# Patient Record
Sex: MALE
[De-identification: names, ages, dates, MRNs, and addresses within clinical notes are randomized per-mention and may not be internally consistent; named-entity substitution may affect disease eponyms.]

---

## 2019-07-24 ENCOUNTER — HOSPITAL ENCOUNTER (OUTPATIENT)
Dept: HOSPITAL 61 - PNCL | Age: 79
Discharge: HOME | End: 2019-07-24
Attending: ANESTHESIOLOGY
Payer: OTHER GOVERNMENT

## 2019-07-24 DIAGNOSIS — Z98.890: ICD-10-CM

## 2019-07-24 DIAGNOSIS — Z79.899: ICD-10-CM

## 2019-07-24 DIAGNOSIS — M19.90: ICD-10-CM

## 2019-07-24 DIAGNOSIS — Z99.81: ICD-10-CM

## 2019-07-24 DIAGNOSIS — I48.91: ICD-10-CM

## 2019-07-24 DIAGNOSIS — I10: ICD-10-CM

## 2019-07-24 DIAGNOSIS — J44.9: ICD-10-CM

## 2019-07-24 DIAGNOSIS — Z79.01: ICD-10-CM

## 2019-07-24 DIAGNOSIS — M79.604: ICD-10-CM

## 2019-07-24 DIAGNOSIS — M54.5: Primary | ICD-10-CM

## 2019-07-24 DIAGNOSIS — E11.9: ICD-10-CM

## 2019-07-24 DIAGNOSIS — Z79.84: ICD-10-CM

## 2019-07-24 PROCEDURE — G0463 HOSPITAL OUTPT CLINIC VISIT: HCPCS

## 2019-07-25 NOTE — PAIN
DATE OF SERVICE:  07/24/2019



INITIAL CONSULTATION FOR PAIN CLINIC



CHIEF COMPLAINT:  Low back and right lower extremity pain.



HISTORY OF PRESENT ILLNESS:  This is an 79-year-old male who presents with

history of pain in the low back for many years, worse over the past 2 years or

so.  The patient reports his pain has been getting worse with walking, standing,

changing positions, especially in the right side.  It is a very sharp, aching

pain that is dull, worse with standing and changing positions, better with

sitting or lying down, does not awaken him from sleep at night, better with

sleeping or lying down.  It does not affect his bowel or bladder control, but

does affect his ability to walk significantly.  He is using a cane in his right

hand to ambulate.  The patient has had physical therapy.  He has been doing some

stretching and strengthening exercises on his own.  The patient reports also

that he has had facet joint [facility], of

which neither one were helpful to decrease the pain on his right side.  The

patient reports he has had no other treatments at this time that he is aware of,

except some epidural injections in the past.  The patient is taking Tylenol up

to 3000 mg a day, which decreased the pain only by about 20%.  The patient

describes the pain as aching and dull, sometimes shooting and stabbing in the

right posterior hip, but generally not radiating to the leg further than the

posterior gluteus.  The patient rates his disability rate from 0-10, 10 being

the worst and 8 with family home responsibilities, social activities, self-care

and life support activities, and 1 with recreational activities.  The patient

did have MRI scan of the lumbar spine, demonstrating degenerative disk disease,

with space narrowing at L4-L5 and L5-S1 more significantly, no disk herniation,

with mild central canal stenosis at L2-L3 and moderate degenerative facet

disease at L3 through L5-S1.



PAST MEDICAL HISTORY:  Significant for diabetes type 2, COPD, on home oxygen,

hypertension, atrial fibrillation, and arthritis.



PAST SURGICAL HISTORY:  Previous lumbar surgery 15 years ago with left-sided

symptoms at that time and previous hernia repair.



CURRENT MEDICATIONS:  Include metformin, psyllium, vitamin B12, vitamin D3,

polyethylene glycol, alfuzosin, levothyroxine, fosinopril, finasteride,

albuterol, amiodarone, Eliquis, Symbicort and daily baby aspirin, also Tylenol.



FAMILY HISTORY:  Significant for heart disease.



SOCIAL HISTORY:  The patient does not drink alcohol, does not smoke, and does

not use any illegal, illicit or recreational drugs.  He is single, lives locally

and is currently retired.



REVIEW OF SYSTEMS:  The patient's review of systems is positive for those items

mentioned in history of present illness.  All systems reviewed and otherwise

negative.  It is complete, full and well documented on the patient's chart.



PHYSICAL EXAMINATION:

VITAL SIGNS:  The patient's blood pressure is 145/79, pulse 62, respirations 18,

temperature 97.8 degrees Fahrenheit.  Height is 6 feet 2 inches, weight is 256

pounds.

GENERAL:  The patient is awake, alert, oriented, appropriate, very pleasant

demeanor.

HEENT:  Head is normocephalic, atraumatic.  Extraocular movements are intact and

symmetrical.  Oral cavity:  Mucous membranes moist and pink.  Dentition is

intact.

NECK:  Shows anterior throat supple without palpable lymphadenopathy noted. 

Swallow reflex is symmetrical.

CHEST:  Shows normal with inspection.  Breath sounds are clear to auscultation

bilaterally.

HEART:  Shows S1, S2 clear.  No murmurs auscultated.

ABDOMEN:  Obese, soft, nontender, nondistended.  No palpable organomegaly is

noted.  No rebound or guarding demonstrated.

BACK:  Shows spine grossly in the midline, slight exaggeration of thoracic

kyphosis and _____ minor flattening of lumbar lordotic curvature.  Lumbar

paraspinous muscle shows symmetrical on inspection, with palpation shows some

moderate tenderness inferiorly, more on the right than the left.  No trigger

points, no radiation of pain, no tenderness over the spinous processes.  The

patient has good rotational motion both laterally greater than 10 degrees right

and left, as well as extension greater than 10 degrees, forward flexion 45

degrees without significant pain reported.  Posterior superior iliac spine is

severely tender on the right with palpation, but not the left.  Also, the

sacroiliac region on the right is very, very tender with palpation, but not on

the left side.

EXTREMITIES:  The patient's lower extremities show deep tendon reflexes at 1+ in

the patellar and tendo-calcaneus tendons.  Motor exam is strong with 5/5

dorsiflexion, extension, quadriceps and hamstring flexion and symmetrical. 

Peripheral pulses are 1+ posterior tibial.  No peripheral edema is noted.  Lower

extremities are warm and dry to touch, equal in color and appearance.  The

patient's Gaenslen's maneuver is positive on the right with posterior

displacement and external rotation of the right hip and low back with leg over

the bed, lower leg that is.  Left side is negative.  Liang's maneuvers are

grossly negative bilaterally.  The patient is able to stand, stand on his toes

without significant loss of balance.  He is using a cane in his right hand and

does walk with a significant limp favoring his right lower extremity.

SKIN:  Shows warm and dry, good turgor.  No edema.  No sores, rashes or

bruising.



IMPRESSION:

1.  This is an 79-year-old male with a long history of low back pain, status

post lumbar surgery several years ago for left-sided radiculopathy, now with

pain in the right sacroiliac joint consistent with sacroiliitis on the right

side.

2.  Arthritis.

3.  Atrial fibrillation.

4.  Hypertension.

5.  Type 2 diabetes.

6.  Anticoagulation therapy.



PLAN:  Options were discussed with the patient including conservative medical

management, physical therapy, and interventional technique.  He would like to

pursue interventional techniques.  We discussed a right-sided sacroiliac joint

injection.  We will wait for preauthorization from his cardiologist to hold his

Eliquis for 3 days prior to the injection and have him return to the clinic at

that time if deemed safe and appropriate to hold this, and we will plan on right

sacroiliac joint injection on return.

 



______________________________

BEAU MARSHALL MD



DR:  MELISSA/hernesto  JOB#:  [id number] / [id number]

DD:  07/24/2019 13:12  DT:  07/25/2019 00:51

## 2019-08-06 ENCOUNTER — HOSPITAL ENCOUNTER (OUTPATIENT)
Dept: HOSPITAL 61 - PNCL | Age: 79
End: 2019-08-06
Attending: ANESTHESIOLOGY
Payer: OTHER GOVERNMENT

## 2019-08-06 DIAGNOSIS — M51.36: ICD-10-CM

## 2019-08-06 DIAGNOSIS — M46.1: Primary | ICD-10-CM

## 2019-08-06 PROCEDURE — 27096 INJECT SACROILIAC JOINT: CPT

## 2019-08-07 NOTE — PAIN
DATE OF SERVICE:  08/06/2019



PROGRESS NOTE FOR PAIN CLINIC



DIAGNOSES:

1.  Bilateral sacroiliitis.

2.  Lumbar degenerative disk disease.



HISTORY OF PRESENT ILLNESS:  The patient is an 79-year-old male who returns for

followup status post initial evaluation and holding his Eliquis.  He has been

off of it for 3 days now and we were to proceed with bilateral sacroiliac joint

injections.  The patient reports his right side is much worse but his left side

has become almost as bad since we last talked to him, which was 07/24/2019.  The

patient reports no new motor or sensory deficits, no new bowel or bladder

incontinence or other complaints.  The patient's pain is an 8 on a scale of 10

at its worst over the past week, 7 on average, 4 at its least and is a 7 today. 

It is aching, sharp, dull, burning, becoming more constant, worse with

weightbearing, standing, walking or sitting for prolonged periods.  The patient

reports it has been awakening him from sleep and is a dull ache at night.



PHYSICAL EXAMINATION:

VITAL SIGNS:  The patient's blood pressure 138/74, pulse 58, respirations are

18, temperature 97.7 degrees Fahrenheit, height is 6 feet 2 inches, weight is

253 pounds.

GENERAL:  The patient is awake, alert, oriented, appropriate, very pleasant

demeanor.

HEENT:  Head is normocephalic, atraumatic.  Extraocular movements are intact and

symmetrical.  Oral cavity:  Mucous membranes moist and pink.  Dentition is

intact.

NECK:  Shows anterior throat supple.

CHEST:  Shows breath sounds clear to auscultation bilaterally.

HEART:  Shows S1, S2 clear.

ABDOMEN:  Soft, nontender, nondistended.

BACK:  Shows spine grossly in the midline, slight flattening of lumbar lordotic

curvature.  There is significant tenderness with palpation over the bilateral

posterior superior iliac spines, especially the right, but also in the left,

very severe pain over the superior aspect of the sacroiliac joint, but without

specific radiation.  The patient has good rotational motion of the lumbar spine,

both laterally as well as extension and flexion without significant increase in

pain.

EXTREMITIES:  Lower extremities show deep tendon reflexes at 1+ in the patellar

and tendo-calcaneus tendons.  Motor exam is strong with 5/5 dorsiflexion,

extension and equal bilaterally.  Peripheral pulses are 1+.  No peripheral edema

is noted bilaterally.



Options were discussed with the patient.  The patient's old chart was reviewed

as his current medication regimen updated.  Current review of systems updated

today as well.  We will proceed with bilateral sacroiliac joint injections using

C-arm fluoroscopic guidance.  Risks were again discussed including, but not

limited to bleeding, infection, possibility of intravascular injection sequelae,

spread of local anesthetic and numbness, side effects of steroid medication as

well as exposure to fluoroscopy and poor results regarding pain control.  The

patient understands and wished to proceed.  The patient will return to clinic in

approximately 2 weeks for followup.  She was counseled on return appointment,

activity level and side effects to be aware of.



DIAGNOSIS:  Bilateral sacroiliitis.



PROCEDURE:  Bilateral sacroiliac joint injection using C-arm fluoroscopic

guidance under sterile prep and drape using local anesthetic.



MEDICATIONS INJECTED:  A total of 120 mg of Depo-Medrol plus total of 6 mL of

0.25% bupivacaine and total of 3 mL of contrast.



CONDITION AT DISCHARGE:  Stable.  The patient tolerated the procedure well, had

no complications.

 



______________________________

BEAU MARSHALL MD



DR:  MELISSA/hernesto  JOB#:  [id number] / [id number]

DD:  08/06/2019 10:06  DT:  08/06/2019 20:32

## 2019-09-12 ENCOUNTER — HOSPITAL ENCOUNTER (OUTPATIENT)
Dept: HOSPITAL 61 - PNCL | Age: 79
End: 2019-09-12
Attending: ANESTHESIOLOGY
Payer: OTHER GOVERNMENT

## 2019-09-12 DIAGNOSIS — M46.1: ICD-10-CM

## 2019-09-12 DIAGNOSIS — M51.36: ICD-10-CM

## 2019-09-12 DIAGNOSIS — M79.18: Primary | ICD-10-CM

## 2019-09-12 PROCEDURE — 20553 NJX 1/MLT TRIGGER POINTS 3/>: CPT

## 2019-09-13 NOTE — PAIN
DATE OF SERVICE:  09/12/2019



PROGRESS NOTE FOR PAIN CLINIC



DIAGNOSES:

1.  Bilateral sacroiliitis.

2.  Lumbar degenerative disk disease.

3.  Myofascial pain.



HISTORY OF PRESENT ILLNESS:  The patient is an 79-year-old male who returns for

followup status post bilateral sacroiliac joint injections about 2 weeks ago. 

The patient reports still significant pain in the low back, but only on the

right side at this time.  The patient reports it is much more noticeable with

walking, standing, changing positions, and sitting for more than 15-20 minutes. 

The patient reports it is worse with first thing in the morning, getting up out

of bed, also with walking and changing positions or bending.  The patient

reports he has a burning pain, sharp and aching in the low back and the right

side mostly.  The patient reports his pain over the last week has been 7 on a

scale of 10 at its worst, 4 on an average, 2 at its least, and is a 2 today. 

The patient reports it is better with sitting or lying down, does not awaken

from sleep at night, and does not cause any loss of bowel or bladder

incontinence.  The patient reports that the sacroiliac joint injections were

only very minimally improving the pain, but only for a few days after the

injections.



PHYSICAL EXAMINATION:

VITAL SIGNS:  Today, the patient's blood pressure is 141/70, pulse 78,

respirations are 18, temperature 97.8 degrees Fahrenheit, height is 6 feet 2

inches, and weight is 234 pounds.

GENERAL:  The patient is awake, alert, oriented, appropriate, and very pleasant

demeanor.

HEENT:  Shows normocephalic and atraumatic.  Extraocular movements are intact

and symmetrical.  Oral cavity:  Mucous membranes moist and pink.  Dentition is

intact.

NECK:  Shows anterior throat is supple without palpable lymphadenopathy noted. 

Swallow reflex symmetrical.

CHEST:  Shows normal on inspection.  Breath sounds are clear to auscultation

bilaterally.

HEART:  Shows S1, S2 clear.  No murmurs auscultated.

ABDOMEN:  Soft, nontender, and nondistended.  No palpable organomegaly is noted.

 No rebound or guarding demonstrated.

BACK:  Shows spine grossly in the midline.  Normal appearing thoracic kyphosis

and lumbar lordotic curvature.  Lumbar paraspinous muscle shows symmetrical on

inspection, on palpation shows some moderate tenderness diffusely in the right

lumbar paraspinous musculature, is very firm rope-like musculature on the right

side only compared to the left without specific radiation, but with significant

pain with palpation.  The patient shows good rotational motion; however, the

lumbar spine both laterally as well as extension and flexion without significant

increase in pain.

EXTREMITIES:  The patient's lower extremities show deep tendon reflexes 1+ in

the patellar and tendo calcaneus tendons.  Motor exam is strong with 5/5

dorsiflexion and extension, quadriceps, and hamstring flexion is symmetrical. 

Peripheral pulses are 1+ posterior tibia.  No peripheral edema is noted.



Options were discussed with the patient.  The patient's old chart was reviewed

as his current medication regimen updated and current review of systems updated

today as well, and we will proceed with trigger point injections of the right

paraspinous musculature and right gluteus musculature as identified.  Risks were

discussed including, but not limited to bleeding, infection, possibility of

intravascular injection sequelae, spread of local anesthetic and numbness, side

effects of steroid medication and poor results regarding pain control.  The

patient understands and wishes to proceed.  The patient will return to clinic in

approximately 2 weeks for followup.  He was counseled on return appointment,

activity level, and side effects to be aware of.



DIAGNOSIS:  Myofascial pain.



PROCEDURE:  Trigger point injections, right-sided lumbar paraspinous musculature

and right-sided gluteus under sterile prep and drape using local anesthetic.



MEDICATION INJECTED:  A total of 6 mL of 0.25% bupivacaine and 40 mg total of

Depo-Medrol after negative aspiration at each injection.



CONDITION AT DISCHARGE:  Stable.  The patient tolerated the procedure well, had

no complications.

 



______________________________

BEAU MARSHALL MD



DR:  MELISSA/hernesto  JOB#:  [id number] / [id number]

DD:  09/12/2019 12:22  DT:  09/12/2019 22:52

## 2019-10-01 ENCOUNTER — HOSPITAL ENCOUNTER (OUTPATIENT)
Dept: HOSPITAL 61 - PNCL | Age: 79
End: 2019-10-01
Attending: ANESTHESIOLOGY
Payer: OTHER GOVERNMENT

## 2019-10-01 DIAGNOSIS — M79.18: ICD-10-CM

## 2019-10-01 DIAGNOSIS — M51.36: ICD-10-CM

## 2019-10-01 DIAGNOSIS — M17.11: Primary | ICD-10-CM

## 2019-10-01 DIAGNOSIS — M46.1: ICD-10-CM

## 2019-10-01 PROCEDURE — 20610 DRAIN/INJ JOINT/BURSA W/O US: CPT

## 2019-10-01 PROCEDURE — 77002 NEEDLE LOCALIZATION BY XRAY: CPT

## 2019-10-01 NOTE — PAIN
DATE OF SERVICE:  10/01/2019



PROGRESS NOTE FOR PAIN CLINIC



DIAGNOSES:

1.  Bilateral sacroiliitis.

2.  Lumbar degenerative disk disease.

3.  Myofascial pain.

4.  Right knee joint pain with osteoarthritis.



HISTORY OF PRESENT ILLNESS:  The patient is an 79-year-old male who returns for

followup status post trigger point injections with about a 75% improvement.  The

patient reports some pain in the right posterior hip, but only minimal.  The

patient reports his chief complaint today is his right knee, which we discussed

with him on his last visit.  Significant pain with walking, standing, changing

positions, especially putting all his weight on his right leg such as in case

stepping on the stair or curb and significant pain with ambulation.  The patient

reports it is better with lying down, sitting, even with getting the weight off

of his knee, the pain is almost gone.  The patient reports the pain is worse

with 4 on a scale of 10, over the past week average 4 and a 3 at its least and

is a 4 today.  The patient reports it is sharp, aching, dull and burning at

times, again with weightbearing much worse.  No new motor or sensory deficits;

however, no new bowel or bladder incontinence or other complaints.



PHYSICAL EXAMINATION:

VITAL SIGNS:  The patient's blood pressure 122/68, pulse 62, respirations 18,

temperature 97.8 degrees Fahrenheit, height is 6 feet 2 inches, weight is 248

pounds.

GENERAL:  The patient is awake, alert, oriented, appropriate, very pleasant

demeanor.

HEENT:  Shows normocephalic, atraumatic.  Extraocular movements are intact and

symmetrical.  Oral cavity:  Mucous membranes moist and pink.  Dentition is

intact.

NECK:  Shows anterior throat supple without palpable lymphadenopathy noted. 

Swallow reflex symmetrical.

CHEST:  Shows normal on inspection.  Breath sounds clear to auscultation

bilaterally.

HEART:  Shows S1, S2 clear.  No murmurs auscultated.

ABDOMEN:  Soft, nontender, nondistended.  No palpable organomegaly is noted.  No

rebound or guarding demonstrated.

BACK:  Shows spine grossly in the midline.  Lumbar paraspinous muscle shows

symmetrical on inspection, with palpation shows some moderate tenderness

diffusely bilaterally, but only diffusely.  No specific trigger point regions

are identified, but some mild tenderness over the posterior superior iliac spine

on the right and left, but only mildly with deep palpation.

EXTREMITIES:  The patient's lower extremities show deep tendon reflexes 1+ in

the patellar and tendo calcaneus tendons.  Motor exam is strong with 5/5

dorsiflexion, extension, quadriceps and hamstring flexion and equal.  The

patient's right knee shows some moderate tenderness with palpation over the

medial collateral ligament with deep palpation, but no radiation.  Left side is

nontender.  The patient has good range of motion, both actively and passively in

the right knee without ratcheting or crepitus.  Peripheral pulses are 1+

posterior tibia.  No peripheral edema is noted bilaterally.



Options were discussed with the patient.  The patient's old chart was reviewed

as his current medication regimen updated.  Current review of systems updated

today as well.  We will proceed with a right intra-articular knee joint

injection today with fluoroscopic guidance.  Risks were again discussed

including, but not limited to bleeding, infection, possibility of intravascular

injection sequelae, spread of local anesthetic and numbness, side effects of

steroid medication, exposure to fluoroscopy and poor results regarding pain

control.  The patient understands and wished to proceed.  The patient will

return to clinic in approximately 2 weeks for followup.  She was counseled on

return appointment, activity level and side effects to be aware of.



DIAGNOSIS:  Right knee joint pain with primary osteoarthritis, right knee joint.



PROCEDURE:  Right intra-articular knee joint injection using C-arm fluoroscopic

guidance under sterile prep and drape using local anesthetic.



MEDICATION INJECTED:  A total of 3 mL of 0.25% bupivacaine, 80 mg Depo-Medrol

and 1.5 mL of contrast.



CONDITION AT DISCHARGE:  Stable.  The patient tolerated the procedure well, had

no complications.

 



______________________________

BEAU MARSHALL MD



DR:  MELISSA/hernesto  JOB#:  [id number] / [id number]

DD:  10/01/2019 10:44  DT:  10/01/2019 11:22

## 2019-11-21 ENCOUNTER — HOSPITAL ENCOUNTER (OUTPATIENT)
Dept: HOSPITAL 61 - PNCL | Age: 79
Discharge: HOME | End: 2019-11-21
Attending: ANESTHESIOLOGY
Payer: OTHER GOVERNMENT

## 2019-11-21 DIAGNOSIS — M51.16: Primary | ICD-10-CM

## 2019-11-21 DIAGNOSIS — M46.1: ICD-10-CM

## 2019-11-21 DIAGNOSIS — M17.11: ICD-10-CM

## 2019-11-21 DIAGNOSIS — M79.18: ICD-10-CM

## 2019-11-21 PROCEDURE — G0463 HOSPITAL OUTPT CLINIC VISIT: HCPCS

## 2019-11-22 NOTE — PAIN
DATE OF SERVICE:  11/21/2019



PROGRESS NOTE FOR PAIN CLINIC



DIAGNOSES:

1.  Bilateral sacroiliitis.

2.  Lumbar radiculopathy with lumbar degenerative disk disease.

3.  Myofascial pain.

4.  Right knee joint pain with osteoarthritis.



HISTORY OF PRESENT ILLNESS:  The patient is an 79-year-old male who returns for

followup status post both sacroiliac joint injections, trigger point injections

and right knee injection.  The patient reports his right knee is doing very well

with near 100% improved, still some pain in the low back radiating down into the

right lower extremity, posterior gluteus, posterior thighs, posterior calf,

which is becoming more noticeable when he is walking only.  The patient reports

it is only on the right side, better with sitting or lying down, does not awaken

her from sleep at night, but much worse with walking, standing, radiating ____

he can walk to stand for about 10-15 minutes.  The patient reports it is aching

pain that is shooting and sharp and dull in the back, radiating, becoming more

constant in the right lower extremity.  The patient reports it is 7 on a scale

of 10 at its worst over the past week, 6 on average, 6 at its least and is 6

today.



PHYSICAL EXAMINATION:

VITAL SIGNS:  The patient's blood pressure is 136/65, pulse 56, respirations 18,

temperature 98.2 degrees Fahrenheit, height 6 feet 2 inches, weight is 248

pounds.

GENERAL:  The patient is awake, alert, oriented, appropriate, very pleasant

demeanor.

HEENT:  Head shows normocephalic, atraumatic.  Extraocular movements are intact

and symmetrical.  Oral cavity:  Mucous membranes moist and pink.  Dentition is

intact.

NECK:  Shows anterior throat supple without palpable lymphadenopathy noted. 

Swallow reflex symmetrical.

CHEST:  Shows normal on inspection.  Breath sounds are clear bilaterally.

HEART:  Shows S1, S2 clear.  No murmurs auscultated.

ABDOMEN:  Soft, nontender, nondistended.  No palpable organomegaly is noted.  No

rebound or guarding demonstrated.

BACK:  Shows spine grossly in the midline.  Normal appearing thoracic kyphosis

and lumbar lordotic curvature.  The patient's lumbar paraspinous muscle shows

symmetrical on inspection, on palpation shows some moderate tenderness diffusely

bilaterally, more on the right than the left, but symmetrical with some mild

tenderness over the sacroiliac regions as well, but only mildly without

radiation.

EXTREMITIES:  Lower extremities show deep tendon reflexes 1+ in the patellar and

tendo-calcaneus tendons.  Motor exam is strong with 5/5 dorsiflexion, extension,

quadriceps and hamstring flexion.  Peripheral pulses are 1+ posterior tibia.  No

peripheral edema is noted.



Options were discussed with the patient.  The patient's old chart was reviewed

as his current medication regimen updated.  Current review of systems updated

today as well.  We will have the patient hold his Eliquis as cleared by his

cardiologist for 3 days prior to return for lumbar epidural steroid injection. 

The patient will continue with stretching and strengthening exercises on his own

and activity as tolerated.  We will have him return once Eliquis held for lumbar

epidural steroid injection at that time.

 



______________________________

BEAU MARSHALL MD



DR:  MELISSA/hernesto  JOB#:  [id number] / [id number]

DD:  11/21/2019 11:03  DT:  11/21/2019 21:09

## 2019-12-05 ENCOUNTER — HOSPITAL ENCOUNTER (OUTPATIENT)
Dept: HOSPITAL 61 - PNCL | Age: 79
End: 2019-12-05
Attending: ANESTHESIOLOGY
Payer: OTHER GOVERNMENT

## 2019-12-05 DIAGNOSIS — M96.1: ICD-10-CM

## 2019-12-05 DIAGNOSIS — M51.16: Primary | ICD-10-CM

## 2019-12-05 DIAGNOSIS — M47.816: ICD-10-CM

## 2019-12-05 PROCEDURE — 62323 NJX INTERLAMINAR LMBR/SAC: CPT

## 2019-12-05 NOTE — PAIN
DATE OF SERVICE:  12/05/2019



PROGRESS NOTE FOR PAIN CLINIC



DIAGNOSES:  Lumbar radiculopathy with lumbar degenerative disk disease and

lumbar spondylosis.



HISTORY OF PRESENT ILLNESS:  The patient is an 79-year-old male, who returns for

followup, status post evaluation after right knee joint injection, was doing

very well after that with also trigger point injections.  We had gained

clearance to hold his Eliquis for 3 days.  As it has been obtained now, he has

held it for 3 days, he would like to proceed with lumbar epidural steroid

injection.  He still reports pain in the low back, right lower extremity,

posterior gluteus, posterior thigh, posterior calf, and mostly in the right side

with some on the left as well.  The patient reports it is a 6 on a scale of 10

at its worst over the past week, 5 on average, 5 at its least and is a 5 today. 

The patient reports it is sharp and aching, shooting, dull, worse with walking,

standing, change in positions.  The patient reports it is better with sitting or

lying down.  He is using a walker to ambulate.  Reports it does not awaken him

from sleep at night.



PHYSICAL EXAMINATION:

VITAL SIGNS:  The patient's blood pressure 125/66, pulse 60, respirations 18,

temperature is 98.3 degrees Fahrenheit, height 6 feet 2 inches, weight is 251

pounds.

GENERAL:  The patient is awake, alert, oriented, appropriate.  He has a very

pleasant demeanor.

HEENT:  Head shows normocephalic, atraumatic.  Extraocular movements are intact

and symmetrical.  Oral cavity:  His mucous membranes are moist and pink. 

Dentition is intact.

NECK:  Shows anterior throat supple without palpable lymphadenopathy noted. 

Swallow reflex symmetrical.

CHEST:  Shows normal on inspection.  Breath sounds clear to auscultation

bilaterally.

HEART:  Shows S1, S2 clear.  No murmurs auscultated.

ABDOMEN:  Soft, nontender, nondistended.  No palpable organomegaly is noted.  No

rebound or guarding demonstrated.

BACK:  Shows spine grossly in the midline.  Normal-appearing thoracic kyphosis. 

Minor flattening of lumbar lordotic curvature with well-healed surgical scarring

noted in the lumbar distribution.  Paraspinous muscle shows symmetrical on

inspection.  On palpation, he has some moderate tenderness diffusely bilaterally

and diffusely without significant radiation.  The patient has good rotational

motion of lumbar spine, both laterally as well as extension and flexion without

significant increase in pain.

EXTREMITIES:  Lower extremities show deep tendon reflexes at 1+ in the patellar

and tendo-calcaneus tendons.  Motor exam is 5/5 with dorsiflexion, extension,

quadriceps and hamstring flexion symmetrical.  Peripheral pulses are 1+

posterior tibia.  No peripheral edema is noted bilaterally.



Options were discussed with the patient.  The patient's old chart was reviewed

as his current medication regimen updated.  Current review of systems updated

today as well.  We will proceed with a lumbar epidural steroid injection today

with fluoroscopic guidance.  Risks were again discussed, including, but not

limited to bleeding, infection, possibility of epidural hematoma, subsequent

neurological compromise, dural puncture, headaches, spinal cord and/or nerve

damage, side effects of steroid medication, and poor results regarding pain

control.  The patient understands and wished to proceed.  The patient will

return to the clinic in approximately 2 weeks for followup.  He was counseled on

return appointment, activity level, and side effects to be aware of.



DIAGNOSES:  Lumbar radiculopathy with lumbar degenerative disk disease, lumbar

spondylosis, and post-lumbar laminectomy syndrome.



PROCEDURE:  Lumbar epidural steroid injection, translaminar approach L5-S1 level

using C-arm fluoroscopic guidance under sterile prep and drape using local

anesthetic.



MEDICATION INJECTED:  A total of 120 mg Depo-Medrol plus 10 mL of

preservative-free normal saline and 2 mL of contrast.



CONDITION AT DISCHARGE:  Stable.  The patient tolerated the procedure well, had

no complications.

 



______________________________

BEAU MARSHALL MD



DR:  MELISSA/hernesto  JOB#:  [id number] / [id number]

DD:  12/05/2019 08:44  DT:  12/05/2019 09:02

## 2020-01-28 ENCOUNTER — HOSPITAL ENCOUNTER (OUTPATIENT)
Dept: HOSPITAL 61 - PNCL | Age: 80
Discharge: HOME | End: 2020-01-28
Attending: ANESTHESIOLOGY
Payer: OTHER GOVERNMENT

## 2020-01-28 DIAGNOSIS — M79.18: ICD-10-CM

## 2020-01-28 DIAGNOSIS — Z88.8: ICD-10-CM

## 2020-01-28 DIAGNOSIS — M51.16: Primary | ICD-10-CM

## 2020-01-28 DIAGNOSIS — M96.1: ICD-10-CM

## 2020-01-28 DIAGNOSIS — M46.1: ICD-10-CM

## 2020-01-28 DIAGNOSIS — Z98.890: ICD-10-CM

## 2020-01-28 DIAGNOSIS — M17.11: ICD-10-CM

## 2020-01-28 PROCEDURE — 62323 NJX INTERLAMINAR LMBR/SAC: CPT

## 2020-01-28 NOTE — PAIN
DATE OF SERVICE:  01/28/2020



PROGRESS NOTE FOR PAIN CLINIC



DIAGNOSES:

1.  Lumbar radiculopathy with lumbar degenerative disk disease and lumbar

post-laminectomy syndrome.

2.  Bilateral sacroiliitis.

3.  Myofascial pain.

4.  Right knee joint pain with osteoarthritis.



HISTORY OF PRESENT ILLNESS:  The patient is an 79-year-old male who returns for

followup status post lumbar epidural steroid injection x 1 on 12/05/2019.  The

patient reports only minimal decrease in pain, still significant radicular pain

in the right lower extremity, posterior gluteus, across the low back into the

posterior thigh and calf.  The patient reports it is an 8 on a scale of 10 at

its worst, 7 on average, 4 at its least, and is a 7 today.  It is worse with

walking, standing, changing positions; better with sitting or lying down; does

not bother him when he is sitting or lying down, does not awaken him from sleep

at night.  The patient still has trouble with ambulation; however, is using a

walker.  The patient reports the pain is radiating, aching, dull, sharp, and

shooting in the right leg.  No new motor or sensory deficits, no new bowel or

bladder incontinence.



PHYSICAL EXAMINATION:

VITAL SIGNS:  The patient's blood pressure 114/64, pulse 60, respirations 18,

temperature 98.0 degrees Fahrenheit, height 6 feet 2 inches, weight is 254

pounds.

GENERAL:  The patient is awake, alert, oriented, appropriate, very pleasant

demeanor.

HEENT:  Shows normocephalic, atraumatic.  Extraocular movements are intact and

symmetrical.  Oral cavity:  Mucous membranes moist and pink.  Dentition is

intact.

NECK:  Shows anterior throat supple without palpable lymphadenopathy noted. 

Swallow reflex symmetrical.

CHEST:  Shows normal on inspection.  Breath sounds are clear bilaterally.

HEART:  Shows S1, S2 clear.  No murmurs auscultated.

ABDOMEN:  Soft, nontender, nondistended.

BACK:  Shows spine grossly in the midline.  Slight exaggeration of thoracic

kyphosis and minor flattening of lumbar lordotic curvature with well-healed

surgical scar noted.  Lumbar paraspinous muscle shows symmetrical on inspection,

on palpation shows some moderate tenderness diffusely bilaterally going

diffusely without significant radiation.  The patient does show good rotational

motion of lumbar spine, both laterally greater than 10 degrees right and left as

well as extension greater than 10 degrees, forward flexion 45 degrees without

significant pain reported.

EXTREMITIES:  The patient's lower extremities show deep tendon reflexes at 1+ in

the patellar and tendo calcaneus tendons.  Motor exam is strong with 5/5

dorsiflexion, extension equal.  Peripheral pulses are 1+ posterior tibial.  No

peripheral edema is noted.



Options were discussed with the patient.  The patient's old chart was reviewed

as his current medication regimen updated.  Current review of systems updated

today as well.  We will proceed with second in a series of lumbar epidural

steroid injections today with fluoroscopic guidance.  Risks were again discussed

including, but not limited to bleeding, infection, possibility of epidural

hematoma, subsequent neurological compromise, dural puncture, headaches, spinal

cord and/or nerve damage, side effects of steroid medication and poor results

regarding pain control.  The patient understands and wished to proceed.  The

patient will return to clinic in approximately 2 weeks for followup.  He was

counseled as to return appointment, activity level, and side effects to be aware

of.



DIAGNOSIS:  Lumbar radiculopathy with lumbar degenerative disk disease and

lumbar post-laminectomy syndrome.



PROCEDURE:  Lumbar epidural steroid injection, translaminar approach at the

L5-S1 level using C-arm fluoroscopic guidance under sterile prep and drape using

local anesthetic.



MEDICATION INJECTED:  A total of 120 mg Depo-Medrol plus 10 mL of

preservative-free normal saline and 2 mL of contrast.



CONDITION AT DISCHARGE:  Stable.  The patient tolerated procedure well, had no

complications.

 



______________________________

BEAU MARSHALL MD



DR:  MELISSA/hernesto  JOB#:  [id number] / [id number]

DD:  01/28/2020 10:24  DT:  01/28/2020 11:00

## 2020-02-20 ENCOUNTER — HOSPITAL ENCOUNTER (OUTPATIENT)
Dept: HOSPITAL 61 - PNCL | Age: 80
End: 2020-02-20
Attending: ANESTHESIOLOGY
Payer: OTHER GOVERNMENT

## 2020-02-20 DIAGNOSIS — M51.16: Primary | ICD-10-CM

## 2020-02-20 DIAGNOSIS — M96.1: ICD-10-CM

## 2020-02-20 PROCEDURE — 62323 NJX INTERLAMINAR LMBR/SAC: CPT

## 2020-02-20 NOTE — PAIN
DATE OF SERVICE:  02/20/2020



PROGRESS NOTE FOR PAIN CLINIC



DIAGNOSES:  Lumbar radiculopathy with lumbar degenerative disk disease and

lumbar post-laminectomy syndrome.



HISTORY OF PRESENT ILLNESS:  The patient is an 79-year-old male who returns for

followup status post lumbar epidural steroid injection x 2.  The patient reports

about 50% improvement for a few weeks after the injection, but the pain is

returning now in the low back and the right lower extremity, posterior gluteus,

posterior thigh, posterior calf, mostly in the back and the right side of the

thigh and hip.  The patient reports it is a 7 on a scale of 10 at its worst, 7

on average, 6 at its least and is a 7 today.  The patient reports no new motor

or sensory deficits, does not bother him when he is sitting or lying down,

generally worse with walking, standing, change in positions, initially was

walking greater distances, doing household activities with greater ease and

comfort, still using his walker to ambulate, has it with him today as well.  The

patient reports no new motor or sensory deficits, no new bowel or bladder

incontinence or other complaints.



PHYSICAL EXAMINATION:

VITAL SIGNS:  The patient's blood pressure is 137/71, pulse 67, respirations 16,

temperature 98.2 degrees Fahrenheit, weight is 251 pounds.

GENERAL:  The patient is awake, alert, oriented, appropriate, very pleasant

demeanor.

HEENT:  Head shows normocephalic, atraumatic.  Extraocular movements are intact

and symmetrical.  Oral cavity:  Mucous membranes moist and pink.  Dentition is

intact.

NECK:  Shows anterior throat supple without palpable lymphadenopathy noted. 

Swallow reflex symmetrical.

CHEST:  Shows normal on inspection.  Breath sounds are clear bilaterally.

HEART:  Shows S1, S2 clear.  No murmurs auscultated.

ABDOMEN:  Soft, obese, nontender, nondistended.

BACK:  Shows spine grossly in the midline.  Normal appearing thoracic kyphosis

and lumbar lordotic curvature slightly flattened with well-healed surgical scar

in the lumbar distribution.  Lumbar paraspinous muscle shows symmetrical on

inspection, with palpation shows some mild tenderness diffusely in the low

lumbar distribution bilaterally, but only diffusely without radiation.  The

patient has good rotational motion of lumbar spine, both laterally as well as

extension and flexion without significant difficulty.

EXTREMITIES:  Lower extremities show deep tendon reflexes 1+ in the patellar and

tendo-calcaneus tendons are equal.  Motor exam is strong with 5/5 dorsiflexion,

extension, quadriceps and hamstring flexion symmetrical.  Peripheral pulses are

1+.  No peripheral edema is noted bilaterally.



Options were discussed with the patient.  The patient's old chart was reviewed

as his current medication regimen updated.  Current review of systems updated

today as well.  We will proceed with a third in the series of lumbar epidural

steroid injection today with fluoroscopic guidance.  Risks were again discussed

including, but not limited to bleeding, infection, possibility of epidural

hematoma, subsequent neurological compromise, dural puncture, headaches, spinal

cord and/or nerve damage, side effects of steroid medication and poor results

regarding pain control.  The patient understands and wished to proceed.  The

patient will return to clinic in approximately 2 weeks for followup.  He was

counseled on return appointment, activity level and side effects to be aware of.



PROCEDURE:  Lumbar epidural steroid injection, translaminar approach at L5-S1

level using C-arm fluoroscopic guidance under sterile prep and drape using local

anesthetic.



MEDICATION INJECTED:  A total of 120 mg Depo-Medrol plus 10 mL of

preservative-free normal saline and 2 mL of contrast.



CONDITION AT DISCHARGE:  Stable.  The patient tolerated the procedure well, had

no complications.

 



______________________________

BEAU MARSHALL MD



DR:  MELISSA/hernesto  JOB#:  [id number] / [id number]

DD:  02/20/2020 09:35  DT:  02/20/2020 12:48

## 2020-06-10 ENCOUNTER — HOSPITAL ENCOUNTER (OUTPATIENT)
Dept: HOSPITAL 61 - PNCL | Age: 80
End: 2020-06-10
Attending: ANESTHESIOLOGY
Payer: OTHER GOVERNMENT

## 2020-06-10 DIAGNOSIS — M51.16: ICD-10-CM

## 2020-06-10 DIAGNOSIS — M96.1: ICD-10-CM

## 2020-06-10 DIAGNOSIS — M46.1: Primary | ICD-10-CM

## 2020-06-10 DIAGNOSIS — M79.18: ICD-10-CM

## 2020-06-10 PROCEDURE — 27096 INJECT SACROILIAC JOINT: CPT

## 2020-06-10 NOTE — PAIN
DATE OF SERVICE:  06/10/2020



PROGRESS NOTE FOR PAIN CLINIC



DIAGNOSES:

1.  Lumbar radiculopathy with lumbar degenerative disk disease and lumbar

post-laminectomy syndrome.

2.  Bilateral sacroiliitis.

3.  Myofascial pain.

4.  Right knee joint pain with osteoarthritis.



HISTORY OF PRESENT ILLNESS:  The patient is an 79-year-old male who returns for

followup status post lumbar epidural steroid injection, 02/20.  The patient

reports he did fairly well, but the pain is now changed, is in the low back and

the hip more than the back and leg with a burning sensation, worse with changing

positions, worse with getting up out of bed in the morning, worse with walking

and standing, better with sitting or lying down, generally does not awaken him

from sleep, but can if he turns onto his right side.  The patient reports it is

a 9 on a scale of 10 at its worst over the past week, 7 on average, 5 at its

least and is a 7 today.  The patient reports some burning pain, it is aching and

sharp, radiating into the hip, but not further into the lower leg.  The patient

reports no new motor or sensory deficits, no new bowel or bladder incontinence.



PHYSICAL EXAMINATION:

VITAL SIGNS:  The patient's blood pressure 110/58, pulse 58, respirations 20,

temperature 98.2 degrees Fahrenheit, weight is 252 pounds.

GENERAL:  The patient is awake, alert, oriented, appropriate, very pleasant

demeanor.

HEENT:  Shows normocephalic, atraumatic.  The patient wears eyeglasses. 

Extraocular movements are intact and symmetrical.  Oral cavity:  Mucous

membranes moist and pink.  Dentition is intact.

NECK:  Shows anterior throat supple without palpable lymphadenopathy noted. 

Swallow reflex symmetrical.

CHEST:  Shows normal on inspection.  Breath sounds are clear bilaterally.

HEART:  Shows S1, S2 clear.  No murmurs auscultated.

ABDOMEN:  Obese, soft, nontender, nondistended.  No palpable organomegaly is

noted.

BACK:  Shows spine grossly in the midline.  Slight exaggeration of thoracic

kyphosis and some minor flattening of lumbar lordotic curvature with well-healed

surgical scar in the lumbar distribution.  Lumbar paraspinous muscle shows

symmetrical on inspection, with palpation shows some mild tenderness in the low

lumbar distribution only bilaterally, but without radiation, without trigger

points or asymmetry.  The patient has good rotational motion of lumbar spine,

both laterally as well as extension and flexion without significant difficulty

or pain reported.  The patient's right sacroiliac joint, however, is

significantly tender with palpation over the posterior superior iliac spine as

well as the sacroiliac joint, the superior aspect itself, left side is only very

minimally painful.

EXTREMITIES:  The patient's lower extremities show deep tendon reflexes 1+ in

the patellar and tendo calcaneus tendons.  Motor exam is strong with 5/5

dorsiflexion, extension, quadriceps and hamstring flexion symmetrical. 

Peripheral pulses are 1+ posterior tibial.



Options were discussed with the patient.  The patient's old chart was reviewed

as his current medication regimen updated.  Current review of systems updated

today as well and we will proceed with a right sacroiliac joint injection today

with fluoroscopic guidance.  Risks were again discussed including, but not

limited to bleeding, infection, possibility of intravascular injection sequelae,

spread of local anesthetic and numbness, side effects of steroid medication and

poor results regarding pain control.  The patient understands and wished to

proceed.  The patient will return to clinic in approximately 2 weeks for

followup.  He was counseled as to return appointment, activity level and side

effects to be aware of.



DIAGNOSIS:  Right sacroiliitis.



PROCEDURE:  Right sacroiliac joint injection using C-arm fluoroscopic guidance

under sterile prep and drape using local anesthetic.



MEDICATION INJECTED:  A total of 80 mg Depo-Medrol plus 3 mL of 0.25%

bupivacaine and 1.5 mL of contrast.



CONDITION AT DISCHARGE:  Stable.  The patient tolerated procedure well, had no

complications.

 



______________________________

BEAU MARSHALL MD



DR:  MELISSA/hernesto  JOB#:  [id number] / [id number]

DD:  06/10/2020 09:56  DT:  06/10/2020 10:29

## 2020-07-06 ENCOUNTER — HOSPITAL ENCOUNTER (OUTPATIENT)
Dept: HOSPITAL 61 - PNCL | Age: 80
End: 2020-07-06
Attending: ANESTHESIOLOGY
Payer: OTHER GOVERNMENT

## 2020-07-06 DIAGNOSIS — M70.61: Primary | ICD-10-CM

## 2020-07-06 DIAGNOSIS — M46.1: ICD-10-CM

## 2020-07-06 DIAGNOSIS — M79.81: ICD-10-CM

## 2020-07-06 DIAGNOSIS — M96.1: ICD-10-CM

## 2020-07-06 DIAGNOSIS — M51.16: ICD-10-CM

## 2020-07-06 PROCEDURE — 20605 DRAIN/INJ JOINT/BURSA W/O US: CPT

## 2020-07-06 PROCEDURE — 77002 NEEDLE LOCALIZATION BY XRAY: CPT

## 2020-07-06 NOTE — PAIN
DATE OF SERVICE:  07/06/2020



PROGRESS NOTE FOR PAIN CLINIC



DIAGNOSES:

1.  Lumbar radiculopathy with lumbar degenerative disk disease and lumbar

post-laminectomy syndrome.

2.  Bilateral sacroiliitis.

3.  Myofascial pain.

4.  Right knee joint pain.

5.  Right greater trochanteric bursitis.



HISTORY OF PRESENT ILLNESS:  The patient is an 79-year-old male who returns for

followup status post previous right sacroiliac joint injection before that

lumbar epidural steroid injection.  The patient reports still having significant

pain and has changed to some degree in the right leg, now is more on the lateral

aspect of his hip and the superior medial aspect of the hip.  The sacroiliac

joint is feeling much better after his last injection, but the pain is now

different.  It is radiating down the side of his right thigh to about the mid

thigh on the right side.  The patient reports it is worse with walking,

standing, changing positions, putting all his weight on his right leg.  Does not

radiate into the groin; however, but is on the lateral side of the leg and is

very tender to point tenderness on the right side of the hip.  The patient

reports it is 7 on a scale of 10 at its worst over the past week, 6 on average,

5 at its least and is a 6 today.  The patient reports it is becoming more

constant and sharp and aching alternating on the right side with greater

pressure and weight on his leg.  The patient reports no new motor or sensory

deficits, no new bowel or bladder incontinence.



PHYSICAL EXAMINATION:

VITAL SIGNS:  The patient's blood pressure 104/64, pulse 64, respirations 16,

temperature 98.6 degrees Fahrenheit, weight is 254 pounds.

GENERAL:  The patient is awake, alert, oriented, appropriate, very pleasant

demeanor.

HEENT:  Shows normocephalic, atraumatic.  The patient is wearing eyeglasses. 

Extraocular movements are intact and symmetrical.  Oral cavity:  Mucous

membranes moist and pink.

NECK:  Shows anterior throat supple.  Neck shows full rotational motion of

cervical spine, both laterally as well as extension and flexion without

difficulty.

CHEST:  Shows normal on inspection.  Breath sounds are clear bilaterally.

HEART:  Shows S1, S2 clear.  No murmurs auscultated.

ABDOMEN:  Obese, soft, nontender, nondistended.

BACK:  Shows spine grossly in the midline.  Slight exaggeration of thoracic

kyphosis and flattening of lumbar lordotic curvature with well-healed surgical

scarring noted.  Lumbar paraspinous muscle shows symmetrical with palpation and

some mild tenderness, but only diffusely throughout the upper, middle and lower

distribution of paraspinous muscles, very mild tenderness over the posterior

superior iliac spine on the right, but no tenderness on the left and no

significant tenderness with deep palpation on the sacroiliac joints bilaterally.

EXTREMITIES:  The patient's lower extremities show deep tendon reflexes at 1+ in

the patellar and tendo calcaneus tendons.  Motor exam is strong with 5/5

dorsiflexion, extension, quadriceps and hamstring flexion.  The patient has

significant tenderness; however, over the right greater trochanter with severe

pain and tenderness over this lateral trochanter, slightly medial and superior

to the trochanter as well with some muscular tension and very firm rope-like

musculature in this area of the lateral thigh.



Options were discussed with the patient.  The patient's old chart was reviewed

as his current medication regimen updated.  Current review of systems updated

today as well.  We will proceed with a right greater trochanteric bursa

injection today with fluoroscopic guidance.  Risks were discussed including but

not limited to bleeding, infection, possibility of intravascular injection

sequelae, spread of local anesthetic and numbness, side effects of steroid

medication, exposure to fluoroscopy and poor results regarding pain control. 

The patient understands and wished to proceed.  The patient will return to

clinic in approximately 2 weeks for followup.  He was counseled on return

appointment, activity level and side effects to be aware of.



DIAGNOSIS:  Right greater trochanteric bursitis.



PROCEDURE:  Right greater trochanteric bursa injection using C-arm fluoroscopic

guidance under sterile prep and drape using local anesthetic.



MEDICATION INJECTED:  A total of 80 mg Depo-Medrol plus 5 mL of 0.25%

bupivacaine and 1.5 mL of contrast.



CONDITION AT DISCHARGE:  Stable.  The patient tolerated procedure well, had no

complications.

 



______________________________

BEAU MARSHALL MD



DR:  MELISSA/hernesto  JOB#:  [id number] / [id number]

DD:  07/06/2020 09:53  DT:  07/06/2020 10:01

## 2020-07-21 ENCOUNTER — HOSPITAL ENCOUNTER (OUTPATIENT)
Dept: HOSPITAL 61 - PNCL | Age: 80
Discharge: HOME | End: 2020-07-21
Attending: ANESTHESIOLOGY
Payer: OTHER GOVERNMENT

## 2020-07-21 DIAGNOSIS — M51.16: Primary | ICD-10-CM

## 2020-07-21 DIAGNOSIS — M96.1: ICD-10-CM

## 2020-07-21 DIAGNOSIS — Z79.899: ICD-10-CM

## 2020-07-21 DIAGNOSIS — M46.1: ICD-10-CM

## 2020-07-21 DIAGNOSIS — Z88.8: ICD-10-CM

## 2020-07-21 DIAGNOSIS — M70.61: ICD-10-CM

## 2020-07-21 DIAGNOSIS — M17.11: ICD-10-CM

## 2020-07-21 PROCEDURE — 62323 NJX INTERLAMINAR LMBR/SAC: CPT

## 2020-07-21 NOTE — PAIN
DATE OF SERVICE:  07/21/2020



PROGRESS NOTE FOR PAIN CLINIC



DIAGNOSES:

1.  Lumbar radiculopathy with lumbar degenerative disk disease and lumbar

post-laminectomy syndrome.

2.  Bilateral sacroiliitis.

3.  Myofascial pain.

4.  Right knee joint pain with osteoarthritis.

5.  Right greater trochanteric bursitis.



HISTORY OF PRESENT ILLNESS:  The patient is an 80-year-old male who returns for

followup status post right greater trochanteric bursa injection with only

minimal decrease in pain.  The patient reports his pain now is in the back,

radiating to posterior gluteus, lateral thigh, lateral calf into the ankle on

the right side, worse with walking, standing and weightbearing.  The patient

reports it is mostly on the side, but some on the front and very little on the

back of the calf and on the back of the thigh.  Certainly, the patient reports

the pain is a 9 on a scale of 10 at its worst over the past week, 9 on average,

7 at its least and is a 9 today.  The patient reports it is burning, aching,

sharp, shooting, cramping at times in the leg with significant tenderness with

weightbearing.  The patient reports he is still sleeping fairly well, does not

awaken him from sleep at night, but has significant pain in the back and the

right leg as previously.  The patient reports the hip is still fairly tender,

but not like it was, but the pain is more in the leg and radiating at this time,

and just in the lateral hip.  The patient reports no new motor or sensory

deficits, no bowel or bladder incontinence.



PHYSICAL EXAMINATION:

VITAL SIGNS:  The patient's blood pressure is 132/73, pulse 73, respirations 18,

temperature is 98.1 degrees Fahrenheit, height is 6 feet 2 inches, weight is 256

pounds.

GENERAL:  The patient is awake, alert, oriented, appropriate, very pleasant

demeanor.

HEENT:  Shows normocephalic, atraumatic.  Extraocular movements are intact and

symmetrical.  Oral cavity:  Mucous membranes moist and pink.  Dentition is

intact.

NECK:  Shows anterior throat supple without palpable lymphadenopathy noted. 

Swallow reflex symmetrical.

CHEST:  Shows normal on inspection.  Breath sounds are clear bilaterally.

HEART:  Shows S1, S2 clear.  No murmurs auscultated.

ABDOMEN:  Soft, nontender, nondistended.

BACK:  Shows spine grossly in the midline.  Normal appearing thoracic kyphosis

and flattening of lumbar lordotic curvature with well-healed surgical scar. 

Lumbar paraspinous muscle shows symmetrical on inspection, on palpation shows

some moderate tenderness diffusely bilaterally, but only diffusely without

significant radiation.  The patient has good rotational motion of lumbar spine,

both laterally as well as extension and flexion without significant increase in

pain.

EXTREMITIES:  The patient's lower extremities show deep tendon reflexes 1+ in

the patellar and tendo-calcaneus tendons.  Motor exam is 5/5 with dorsiflexion,

extension, quadriceps and hamstring flexion symmetrical.   Peripheral pulses are

1+ posterior tibia.  No peripheral edema is noted bilaterally.



Options were discussed with the patient.  The patient's old chart was reviewed

as his current medication regimen updated.  Current review of systems updated

today as well.  We will proceed with a first in this series of lumbar epidural

steroid injection today with fluoroscopic guidance.  Risks were discussed

including but not limited to bleeding, infection, possibility of epidural

hematoma, subsequent neurological compromise, dural puncture, headaches, spinal

cord and/or nerve damage, side effects of steroid medication and poor results

regarding pain control.  The patient understands and wished to proceed.  The

patient will return to the clinic in approximately 2 weeks for followup.  He was

counseled on return appointment, activity level and side effects to be aware of.



DIAGNOSES:  Lumbar radiculopathy with lumbar degenerative disk disease and

lumbar post-laminectomy syndrome.



PROCEDURE:  Lumbar epidural steroid injection, translaminar approach L5-S1 level

using C-arm fluoroscopic guidance under sterile prep and drape using local

anesthetic.



MEDICATION INJECTED:  A total of 120 mg Depo-Medrol plus 10 mL of

preservative-free normal saline and 2 mL of contrast.



CONDITION AT DISCHARGE:  Stable.  The patient tolerated procedure well, had no

complications.

 



______________________________

BEAU MARSHALL MD



DR:  MELISSA/hernesto  JOB#:  [id number] / [id number]

DD:  07/21/2020 08:43  DT:  07/21/2020 09:29